# Patient Record
Sex: MALE | Race: WHITE | NOT HISPANIC OR LATINO | URBAN - METROPOLITAN AREA
[De-identification: names, ages, dates, MRNs, and addresses within clinical notes are randomized per-mention and may not be internally consistent; named-entity substitution may affect disease eponyms.]

---

## 2018-08-10 ENCOUNTER — EMERGENCY (EMERGENCY)
Facility: HOSPITAL | Age: 43
LOS: 0 days | Discharge: HOME | End: 2018-08-10
Attending: EMERGENCY MEDICINE | Admitting: EMERGENCY MEDICINE
Payer: COMMERCIAL

## 2018-08-10 VITALS
TEMPERATURE: 98 F | RESPIRATION RATE: 18 BRPM | SYSTOLIC BLOOD PRESSURE: 131 MMHG | DIASTOLIC BLOOD PRESSURE: 100 MMHG | HEART RATE: 72 BPM | OXYGEN SATURATION: 100 %

## 2018-08-10 VITALS — HEIGHT: 72 IN | WEIGHT: 175.05 LBS

## 2018-08-10 DIAGNOSIS — R07.9 CHEST PAIN, UNSPECIFIED: ICD-10-CM

## 2018-08-10 DIAGNOSIS — R55 SYNCOPE AND COLLAPSE: ICD-10-CM

## 2018-08-10 LAB
ALBUMIN SERPL ELPH-MCNC: 4.2 G/DL — SIGNIFICANT CHANGE UP (ref 3.3–5)
ALP SERPL-CCNC: 70 U/L — SIGNIFICANT CHANGE UP (ref 40–120)
ALT FLD-CCNC: 31 U/L — SIGNIFICANT CHANGE UP (ref 12–78)
ANION GAP SERPL CALC-SCNC: 7 MMOL/L — SIGNIFICANT CHANGE UP (ref 5–17)
AST SERPL-CCNC: 14 U/L — LOW (ref 15–37)
BASOPHILS # BLD AUTO: 0.02 K/UL — SIGNIFICANT CHANGE UP (ref 0–0.2)
BASOPHILS NFR BLD AUTO: 0.4 % — SIGNIFICANT CHANGE UP (ref 0–2)
BILIRUB SERPL-MCNC: 1.4 MG/DL — HIGH (ref 0.2–1.2)
BUN SERPL-MCNC: 12 MG/DL — SIGNIFICANT CHANGE UP (ref 7–23)
CALCIUM SERPL-MCNC: 9.1 MG/DL — SIGNIFICANT CHANGE UP (ref 8.5–10.1)
CHLORIDE SERPL-SCNC: 105 MMOL/L — SIGNIFICANT CHANGE UP (ref 96–108)
CO2 SERPL-SCNC: 29 MMOL/L — SIGNIFICANT CHANGE UP (ref 22–31)
CREAT SERPL-MCNC: 1.05 MG/DL — SIGNIFICANT CHANGE UP (ref 0.5–1.3)
EOSINOPHIL # BLD AUTO: 0.03 K/UL — SIGNIFICANT CHANGE UP (ref 0–0.5)
EOSINOPHIL NFR BLD AUTO: 0.6 % — SIGNIFICANT CHANGE UP (ref 0–6)
GLUCOSE SERPL-MCNC: 99 MG/DL — SIGNIFICANT CHANGE UP (ref 70–99)
HCT VFR BLD CALC: 45.7 % — SIGNIFICANT CHANGE UP (ref 39–50)
HGB BLD-MCNC: 15.3 G/DL — SIGNIFICANT CHANGE UP (ref 13–17)
IMM GRANULOCYTES NFR BLD AUTO: 0.2 % — SIGNIFICANT CHANGE UP (ref 0–1.5)
LYMPHOCYTES # BLD AUTO: 1.13 K/UL — SIGNIFICANT CHANGE UP (ref 1–3.3)
LYMPHOCYTES # BLD AUTO: 21.8 % — SIGNIFICANT CHANGE UP (ref 13–44)
MCHC RBC-ENTMCNC: 29.9 PG — SIGNIFICANT CHANGE UP (ref 27–34)
MCHC RBC-ENTMCNC: 33.5 GM/DL — SIGNIFICANT CHANGE UP (ref 32–36)
MCV RBC AUTO: 89.3 FL — SIGNIFICANT CHANGE UP (ref 80–100)
MONOCYTES # BLD AUTO: 0.32 K/UL — SIGNIFICANT CHANGE UP (ref 0–0.9)
MONOCYTES NFR BLD AUTO: 6.2 % — SIGNIFICANT CHANGE UP (ref 2–14)
NEUTROPHILS # BLD AUTO: 3.68 K/UL — SIGNIFICANT CHANGE UP (ref 1.8–7.4)
NEUTROPHILS NFR BLD AUTO: 70.8 % — SIGNIFICANT CHANGE UP (ref 43–77)
NRBC # BLD: 0 /100 WBCS — SIGNIFICANT CHANGE UP (ref 0–0)
PLATELET # BLD AUTO: 172 K/UL — SIGNIFICANT CHANGE UP (ref 150–400)
POTASSIUM SERPL-MCNC: 3.9 MMOL/L — SIGNIFICANT CHANGE UP (ref 3.5–5.3)
POTASSIUM SERPL-SCNC: 3.9 MMOL/L — SIGNIFICANT CHANGE UP (ref 3.5–5.3)
PROT SERPL-MCNC: 7.9 GM/DL — SIGNIFICANT CHANGE UP (ref 6–8.3)
RBC # BLD: 5.12 M/UL — SIGNIFICANT CHANGE UP (ref 4.2–5.8)
RBC # FLD: 12.7 % — SIGNIFICANT CHANGE UP (ref 10.3–14.5)
SODIUM SERPL-SCNC: 141 MMOL/L — SIGNIFICANT CHANGE UP (ref 135–145)
TROPONIN I SERPL-MCNC: <0.015 NG/ML — SIGNIFICANT CHANGE UP (ref 0.01–0.04)
WBC # BLD: 5.19 K/UL — SIGNIFICANT CHANGE UP (ref 3.8–10.5)
WBC # FLD AUTO: 5.19 K/UL — SIGNIFICANT CHANGE UP (ref 3.8–10.5)

## 2018-08-10 PROCEDURE — 99285 EMERGENCY DEPT VISIT HI MDM: CPT

## 2018-08-10 PROCEDURE — 93010 ELECTROCARDIOGRAM REPORT: CPT

## 2018-08-10 PROCEDURE — 71046 X-RAY EXAM CHEST 2 VIEWS: CPT | Mod: 26

## 2018-08-10 RX ORDER — SODIUM CHLORIDE 9 MG/ML
2000 INJECTION INTRAMUSCULAR; INTRAVENOUS; SUBCUTANEOUS ONCE
Qty: 0 | Refills: 0 | Status: COMPLETED | OUTPATIENT
Start: 2018-08-10 | End: 2018-08-10

## 2018-08-10 RX ORDER — SODIUM CHLORIDE 9 MG/ML
3 INJECTION INTRAMUSCULAR; INTRAVENOUS; SUBCUTANEOUS ONCE
Qty: 0 | Refills: 0 | Status: COMPLETED | OUTPATIENT
Start: 2018-08-10 | End: 2018-08-10

## 2018-08-10 RX ADMIN — SODIUM CHLORIDE 3 MILLILITER(S): 9 INJECTION INTRAMUSCULAR; INTRAVENOUS; SUBCUTANEOUS at 12:53

## 2018-08-10 RX ADMIN — SODIUM CHLORIDE 2000 MILLILITER(S): 9 INJECTION INTRAMUSCULAR; INTRAVENOUS; SUBCUTANEOUS at 12:54

## 2018-08-10 NOTE — ED STATDOCS - PROGRESS NOTE DETAILS
44 y/o M with PMH of HA presents with near syncopal episode yesterday and chest pain this AM. Pt states he was playing outside with his children yesterday when the episode occurred. No fall, trauma. Reported experiencing tingling in hands/feet which resolved after lying down. CP described as left sided pressure which lasted for 1 hour. Of note patient states he had stress test 8 years ago which was negative. Had Holter monitor at same time, but cannot recall findings. Admits to nausea without vomiting. Denies SOB, palpitations, LE edema, HA. PE: NAD. HEENT: NC/AT, no carotid bruit. Cardiac: s1/s2, RRR. Lungs: CTAB. Abdomen: NBSx4, soft, nontender. PV: no LE swelling or calf tenderness. A/P: near syncope. EKG, labs, CXR. Reassess. Likely d/c with cardiology follow up. - Garfield Parks PA-C Pt is PERC negative. HEART score: 1. - Garfield Parks PA-C 44 y/o M with PMH of HA presents with near syncopal episode yesterday and chest pain this AM. Pt states he was playing outside with his children yesterday when the episode occurred. No fall, trauma. Reported experiencing tingling in hands/feet which resolved after lying down. CP described as left sided pressure which lasted for 1 hour. Of note patient states he had stress test 8 years ago which was negative. Had Holter monitor at same time, but cannot recall findings. Admits to nausea without vomiting. Denies SOB, palpitations, LE edema, HA. PE: NAD. HEENT: NC/AT, no carotid bruit. Cardiac: s1/s2, RRR. Lungs: CTAB. Abdomen: NBSx4, soft, nontender. PV: no LE swelling or calf tenderness. A/P: near syncope. EKG, labs, CXR. Reassess. Likely d/c with cardiology follow up. - KAYCEE Kirby Agree with ACP exam no leg swelling or calf tenderness. JW Sx improved with hydration.  PT dehydrated.  EKG reveals no evidence of ACS.  Trop x1 negative.  HEART 1. No fever or EKG findings suggestive of pericarditis, or myocarditis.  No Nichols's Triad or signs of pericardial tamponade.  No clinical findings suggestive of  pulmonary embolism.  Wells low risk PERC negative.  CXR normal with clear lungs, normal mediastinum, and bilateral breath sounds.  No findings suggestive of pneumothorax, pneumonia, or esophageal perforation.  Historically pain not abrupt in onset, not tearing or ripping, pulses are symmetric, no murmur noted, no clinical findings suggestive of aortic dissection. JW Sx improved with hydration.  PT dehydrated.  EKG reveals no evidence of ACS.  Trop x1 negative.  HEART 1. No fever or EKG findings suggestive of pericarditis, or myocarditis.  No Nichols's Triad or signs of pericardial tamponade.  No clinical findings suggestive of  pulmonary embolism.  Wells low risk PERC negative.  CXR normal with clear lungs, normal mediastinum, and bilateral breath sounds.  No findings suggestive of pneumothorax, pneumonia, or esophageal perforation.  Historically pain not abrupt in onset, not tearing or ripping, pulses are symmetric, no murmur noted, no clinical findings suggestive of aortic dissection.  I reviewed the alarm symptoms of this patient's diagnosis and discussed criteria for their return to the emergency department.  I instructed the patient to return to the emergency department with any alarm symptoms for their specific diagnosis including chest pain, SOB, syncope, any worsening symptoms, and any other concerns.  PT instructed to follow up with cardiologist.  I instructed this patient to call their primary doctor today, to inform them of their visit to the emergency department, and to obtain a repeat evaluation in the next 24 hours.  This patient understood and agreed with our plan for follow up.  At the time of discharge this patient remained in stable condition, in no acute distress, with stable vital signs.

## 2018-08-10 NOTE — ED STATDOCS - NS_ ATTENDINGSCRIBEDETAILS _ED_A_ED_FT
The scribe's documentation has been prepared under my direction and personally reviewed by me in its entirety.  I confirm that the note above accurately reflects all my work, treatment, procedures, and decision making except where otherwise noted or amended by me.  David Benz M.D.

## 2018-08-10 NOTE — ED STATDOCS - OBJECTIVE STATEMENT
42 y/o male with a PMHx of migraines presents to the ED c/o episode of CP today, near syncope yesterday. Yesterday pt was playing with his children outside and had sudden onset of dizziness, lightheadedness, near syncope that resolved with lying down. +finger tingling. Today, pt had an episode of constant CP, described as a pressure, that lasted for 1 hour. Pt's wife notes that pt has a rash. No back pain, neck pain, HA, SOB, vomiting. No illicit drug use. Non-smoker. No recent EtOH use. No h/o blood clots. No anti-coagulants.

## 2018-08-10 NOTE — ED ADULT NURSE NOTE - NSIMPLEMENTINTERV_GEN_ALL_ED
Implemented All Universal Safety Interventions:  [place] to call system. Call bell, personal items and telephone within reach. Instruct patient to call for assistance. Room bathroom lighting operational. Non-slip footwear when patient is off stretcher. Physically safe environment: no spills, clutter or unnecessary equipment. Stretcher in lowest position, wheels locked, appropriate side rails in place.

## 2018-08-10 NOTE — ED STATDOCS - ATTENDING CONTRIBUTION TO CARE
MILKA Benz MD,  performed the initial face to face bedside interview with this patient regarding history of present illness, review of symptoms and relevant past medical, social and family history.  I completed an independent physical examination.  I was the initial provider who evaluated this patient. I have signed out the follow up of any pending tests (i.e. labs, radiological studies) to the ACP.  I have communicated the patient’s plan of care and disposition with the ACP.

## 2018-08-10 NOTE — ED STATDOCS - MEDICAL DECISION MAKING DETAILS
44 y/o M p/w episode of CP today, near syncope yesterday. VS normal. Low risk CP, PERC negative. Plan for 1 set of troponin, metabolic, hematologic evaluation. Likely d/c with cardio f/u. 44 y/o M p/w episode of CP today, near syncope yesterday. VS normal. Low risk CP, Wells low risk PERC negative.  No findings consistent with life threatening causes of chest pain.  Obtain intravenous access, initiate continuous cardiac monitoring for arrhythmia/ischemia, continuous pulse oximetry monitoring, provide supplementary O2 if required maintaining Hg saturation above 96%. CMP to screen for electrolyte abnormality, assess renal function, liver function, acid base status.  PT/PTT to obtain baseline coagulation profile in preparation for urgent anticoagulation if requied and to assess potential bleeding risk.  CBC to screen for anemia, platelet count, signs of infection, and signs of malignancy.  EKG with prior comparison to screen for UA/NSTEMI, STEMI, PE, conduction abnormality, and arrhythmia.  Serial EKGs with worsening chest pain, positive and repeat cardiac enzymes to assess for dynamic EKG changes and ischemia.  Serial Cardiac Enzymes and risk stratification according to the HEART Risk Stratification System.  Chest radiograph, carotid pulses, pulses in both arms, to screen for dissection, pneumothorax, pneumonia, effusion, Boerhave Syndrome, perforation, and atypical causes of chest pain.  Assess for PE according to Wells/PERC criteria.  Treat painwith nitroglycerine, morphine, NSAIDS as required.  Administer Aspirin within 30 minutes of admission: 50%-70% decrease in mortality UA/NSTEMI NEJM 1988;319;1105.  23% decrease in death with STEMI, DIANE-2 Lancet 1988, ii:349.  Fluid resuscitation as required.  Reassess.

## 2018-08-10 NOTE — ED ADULT NURSE NOTE - OBJECTIVE STATEMENT
pt reports chest pain since yesterday afternoon while resting outside , pt also has poison ivy on left arm